# Patient Record
Sex: FEMALE | Race: WHITE | Employment: FULL TIME | URBAN - METROPOLITAN AREA
[De-identification: names, ages, dates, MRNs, and addresses within clinical notes are randomized per-mention and may not be internally consistent; named-entity substitution may affect disease eponyms.]

---

## 2017-02-23 LAB
HBV SURFACE AG SERPL QL IA: NEGATIVE
HIV 1+2 AB+HIV1 P24 AG SERPL QL IA: NEGATIVE
RUBELLA ANTIBODY IGG QUANTITATIVE: NORMAL IU/ML
T PALLIDUM IGG SER QL: NONREACTIVE

## 2017-08-31 LAB — GROUP B STREP PCR: NEGATIVE

## 2017-09-18 ENCOUNTER — HOSPITAL ENCOUNTER (INPATIENT)
Facility: CLINIC | Age: 32
LOS: 2 days | Discharge: HOME | End: 2017-09-20
Attending: OBSTETRICS & GYNECOLOGY | Admitting: OBSTETRICS & GYNECOLOGY
Payer: COMMERCIAL

## 2017-09-18 PROBLEM — Z34.90 PREGNANCY: Status: ACTIVE | Noted: 2017-09-18

## 2017-09-18 PROBLEM — Z36.89 ENCOUNTER FOR TRIAGE IN PREGNANT PATIENT: Status: ACTIVE | Noted: 2017-09-18

## 2017-09-18 PROCEDURE — 25000128 H RX IP 250 OP 636: Performed by: OBSTETRICS & GYNECOLOGY

## 2017-09-18 PROCEDURE — 0KQM0ZZ REPAIR PERINEUM MUSCLE, OPEN APPROACH: ICD-10-PCS | Performed by: OBSTETRICS & GYNECOLOGY

## 2017-09-18 PROCEDURE — 40000083 ZZH STATISTIC IP LACTATION SERVICES 1-15 MIN

## 2017-09-18 PROCEDURE — 25000132 ZZH RX MED GY IP 250 OP 250 PS 637: Performed by: OBSTETRICS & GYNECOLOGY

## 2017-09-18 PROCEDURE — 25000125 ZZHC RX 250: Performed by: OBSTETRICS & GYNECOLOGY

## 2017-09-18 PROCEDURE — 99215 OFFICE O/P EST HI 40 MIN: CPT

## 2017-09-18 PROCEDURE — 25000128 H RX IP 250 OP 636

## 2017-09-18 PROCEDURE — 72200001 ZZH LABOR CARE VAGINAL DELIVERY SINGLE

## 2017-09-18 PROCEDURE — 12000029 ZZH R&B OB INTERMEDIATE

## 2017-09-18 RX ORDER — ONDANSETRON 2 MG/ML
4 INJECTION INTRAMUSCULAR; INTRAVENOUS EVERY 6 HOURS PRN
Status: DISCONTINUED | OUTPATIENT
Start: 2017-09-18 | End: 2017-09-18

## 2017-09-18 RX ORDER — METHYLERGONOVINE MALEATE 0.2 MG/ML
INJECTION INTRAVENOUS
Status: COMPLETED
Start: 2017-09-18 | End: 2017-09-18

## 2017-09-18 RX ORDER — OXYTOCIN 10 [USP'U]/ML
10 INJECTION, SOLUTION INTRAMUSCULAR; INTRAVENOUS
Status: DISCONTINUED | OUTPATIENT
Start: 2017-09-18 | End: 2017-09-20 | Stop reason: HOSPADM

## 2017-09-18 RX ORDER — NALOXONE HYDROCHLORIDE 0.4 MG/ML
.1-.4 INJECTION, SOLUTION INTRAMUSCULAR; INTRAVENOUS; SUBCUTANEOUS
Status: DISCONTINUED | OUTPATIENT
Start: 2017-09-18 | End: 2017-09-18

## 2017-09-18 RX ORDER — OXYTOCIN/0.9 % SODIUM CHLORIDE 30/500 ML
100-340 PLASTIC BAG, INJECTION (ML) INTRAVENOUS CONTINUOUS PRN
Status: COMPLETED | OUTPATIENT
Start: 2017-09-18 | End: 2017-09-18

## 2017-09-18 RX ORDER — IBUPROFEN 800 MG/1
800 TABLET, FILM COATED ORAL
Status: DISCONTINUED | OUTPATIENT
Start: 2017-09-18 | End: 2017-09-18

## 2017-09-18 RX ORDER — BISACODYL 10 MG
10 SUPPOSITORY, RECTAL RECTAL DAILY PRN
Status: DISCONTINUED | OUTPATIENT
Start: 2017-09-20 | End: 2017-09-20 | Stop reason: HOSPADM

## 2017-09-18 RX ORDER — FENTANYL CITRATE 50 UG/ML
50-100 INJECTION, SOLUTION INTRAMUSCULAR; INTRAVENOUS
Status: DISCONTINUED | OUTPATIENT
Start: 2017-09-18 | End: 2017-09-18

## 2017-09-18 RX ORDER — OXYTOCIN/0.9 % SODIUM CHLORIDE 30/500 ML
340 PLASTIC BAG, INJECTION (ML) INTRAVENOUS CONTINUOUS PRN
Status: DISCONTINUED | OUTPATIENT
Start: 2017-09-18 | End: 2017-09-20 | Stop reason: HOSPADM

## 2017-09-18 RX ORDER — AMOXICILLIN 250 MG
1-2 CAPSULE ORAL 2 TIMES DAILY
Status: DISCONTINUED | OUTPATIENT
Start: 2017-09-18 | End: 2017-09-20 | Stop reason: HOSPADM

## 2017-09-18 RX ORDER — EPHEDRINE SULFATE 50 MG/ML
INJECTION, SOLUTION INTRAMUSCULAR; INTRAVENOUS; SUBCUTANEOUS
Status: DISCONTINUED
Start: 2017-09-18 | End: 2017-09-18 | Stop reason: HOSPADM

## 2017-09-18 RX ORDER — ACETAMINOPHEN 325 MG/1
650 TABLET ORAL EVERY 4 HOURS PRN
Status: DISCONTINUED | OUTPATIENT
Start: 2017-09-18 | End: 2017-09-20 | Stop reason: HOSPADM

## 2017-09-18 RX ORDER — OXYTOCIN/0.9 % SODIUM CHLORIDE 30/500 ML
100 PLASTIC BAG, INJECTION (ML) INTRAVENOUS CONTINUOUS
Status: DISCONTINUED | OUTPATIENT
Start: 2017-09-18 | End: 2017-09-19 | Stop reason: CLARIF

## 2017-09-18 RX ORDER — NALOXONE HYDROCHLORIDE 0.4 MG/ML
.1-.4 INJECTION, SOLUTION INTRAMUSCULAR; INTRAVENOUS; SUBCUTANEOUS
Status: DISCONTINUED | OUTPATIENT
Start: 2017-09-18 | End: 2017-09-20 | Stop reason: HOSPADM

## 2017-09-18 RX ORDER — IBUPROFEN 400 MG/1
400-800 TABLET, FILM COATED ORAL EVERY 6 HOURS PRN
Status: DISCONTINUED | OUTPATIENT
Start: 2017-09-18 | End: 2017-09-20 | Stop reason: HOSPADM

## 2017-09-18 RX ORDER — OXYTOCIN 10 [USP'U]/ML
10 INJECTION, SOLUTION INTRAMUSCULAR; INTRAVENOUS
Status: DISCONTINUED | OUTPATIENT
Start: 2017-09-18 | End: 2017-09-18

## 2017-09-18 RX ORDER — HYDROCORTISONE 2.5 %
CREAM (GRAM) TOPICAL 3 TIMES DAILY PRN
Status: DISCONTINUED | OUTPATIENT
Start: 2017-09-18 | End: 2017-09-20 | Stop reason: HOSPADM

## 2017-09-18 RX ORDER — METHYLERGONOVINE MALEATE 0.2 MG/ML
200 INJECTION INTRAVENOUS
Status: DISCONTINUED | OUTPATIENT
Start: 2017-09-18 | End: 2017-09-18

## 2017-09-18 RX ORDER — OXYCODONE HYDROCHLORIDE 5 MG/1
5 TABLET ORAL EVERY 4 HOURS PRN
Status: DISCONTINUED | OUTPATIENT
Start: 2017-09-18 | End: 2017-09-20 | Stop reason: HOSPADM

## 2017-09-18 RX ORDER — MISOPROSTOL 200 UG/1
400 TABLET ORAL
Status: DISCONTINUED | OUTPATIENT
Start: 2017-09-18 | End: 2017-09-20 | Stop reason: HOSPADM

## 2017-09-18 RX ORDER — ACETAMINOPHEN 325 MG/1
650 TABLET ORAL EVERY 4 HOURS PRN
Status: DISCONTINUED | OUTPATIENT
Start: 2017-09-18 | End: 2017-09-18

## 2017-09-18 RX ORDER — OXYCODONE AND ACETAMINOPHEN 5; 325 MG/1; MG/1
1 TABLET ORAL
Status: DISCONTINUED | OUTPATIENT
Start: 2017-09-18 | End: 2017-09-18

## 2017-09-18 RX ORDER — LANOLIN 100 %
OINTMENT (GRAM) TOPICAL
Status: DISCONTINUED | OUTPATIENT
Start: 2017-09-18 | End: 2017-09-20 | Stop reason: HOSPADM

## 2017-09-18 RX ORDER — SODIUM CHLORIDE, SODIUM LACTATE, POTASSIUM CHLORIDE, CALCIUM CHLORIDE 600; 310; 30; 20 MG/100ML; MG/100ML; MG/100ML; MG/100ML
INJECTION, SOLUTION INTRAVENOUS CONTINUOUS
Status: DISCONTINUED | OUTPATIENT
Start: 2017-09-18 | End: 2017-09-18

## 2017-09-18 RX ORDER — CARBOPROST TROMETHAMINE 250 UG/ML
250 INJECTION, SOLUTION INTRAMUSCULAR
Status: DISCONTINUED | OUTPATIENT
Start: 2017-09-18 | End: 2017-09-18

## 2017-09-18 RX ADMIN — IBUPROFEN 800 MG: 400 TABLET ORAL at 10:36

## 2017-09-18 RX ADMIN — ACETAMINOPHEN 650 MG: 325 TABLET, FILM COATED ORAL at 14:15

## 2017-09-18 RX ADMIN — ACETAMINOPHEN 650 MG: 325 TABLET, FILM COATED ORAL at 05:12

## 2017-09-18 RX ADMIN — OXYTOCIN-SODIUM CHLORIDE 0.9% IV SOLN 30 UNIT/500ML 100 ML/HR: 30-0.9/5 SOLUTION at 04:14

## 2017-09-18 RX ADMIN — IBUPROFEN 800 MG: 400 TABLET ORAL at 16:48

## 2017-09-18 RX ADMIN — OXYCODONE HYDROCHLORIDE 5 MG: 5 TABLET ORAL at 14:48

## 2017-09-18 RX ADMIN — SENNOSIDES AND DOCUSATE SODIUM 1 TABLET: 8.6; 5 TABLET ORAL at 20:14

## 2017-09-18 RX ADMIN — METHYLERGONOVINE MALEATE 200 MCG: 0.2 INJECTION INTRAVENOUS at 05:13

## 2017-09-18 RX ADMIN — IBUPROFEN 800 MG: 400 TABLET ORAL at 03:51

## 2017-09-18 RX ADMIN — ACETAMINOPHEN 650 MG: 325 TABLET, FILM COATED ORAL at 18:21

## 2017-09-18 RX ADMIN — LIDOCAINE HYDROCHLORIDE 20 ML: 10 INJECTION, SOLUTION INFILTRATION; PERINEURAL at 03:14

## 2017-09-18 RX ADMIN — IBUPROFEN 800 MG: 400 TABLET ORAL at 22:46

## 2017-09-18 RX ADMIN — ACETAMINOPHEN 650 MG: 325 TABLET, FILM COATED ORAL at 09:33

## 2017-09-18 RX ADMIN — SODIUM CHLORIDE, POTASSIUM CHLORIDE, SODIUM LACTATE AND CALCIUM CHLORIDE 1000 ML: 600; 310; 30; 20 INJECTION, SOLUTION INTRAVENOUS at 02:39

## 2017-09-18 RX ADMIN — SENNOSIDES AND DOCUSATE SODIUM 1 TABLET: 8.6; 5 TABLET ORAL at 09:33

## 2017-09-18 RX ADMIN — OXYTOCIN-SODIUM CHLORIDE 0.9% IV SOLN 30 UNIT/500ML 340 ML/HR: 30-0.9/5 SOLUTION at 03:10

## 2017-09-18 NOTE — LACTATION NOTE
CLIFFORD to see patient briefly.  She was sleepy at the time of the visit.  She reports that her baby has been nursing well and she has no questions.  She was encouraged to call  for follow up prior to discharge today if needed or if any questions arise when she is more awake.

## 2017-09-18 NOTE — PROGRESS NOTES
"[facility] Obstetrics Post-Partum Progress Note        Interval History:   Doing well.  Pain is adequately controlled.  Vaginal bleeding is normal postpartum flow after Methergine.  Breastfeeding well.           Significant Problems:      Patient Active Problem List   Diagnosis     Indication for care in labor or delivery     Encounter for triage in pregnant patient     Pregnancy             Review of Systems:    The patient denies any chest pain, shortness of breath, excessive pain, fever, chills, nausea or vomiting.          Medications:   All medications related to the patient's surgery have been reviewed          Physical Exam:     Patient Vitals for the past 24 hrs:   BP Temp Temp src Pulse Resp Height Weight   09/18/17 0800 128/72 98.6  F (37  C) - 84 18 - -   09/18/17 0529 127/74 - - - - - -   09/18/17 0514 120/67 - - - - - -   09/18/17 0459 126/66 - - - - - -   09/18/17 0444 123/70 - - - - - -   09/18/17 0429 119/72 98.8  F (37.1  C) Oral - 18 - -   09/18/17 0414 116/74 - - - - - -   09/18/17 0404 124/73 - - - - - -   09/18/17 0344 125/74 - - - - - -   09/18/17 0329 130/77 - - - - - -   09/18/17 0322 130/85 - - - 22 - -   09/18/17 0213 - - - - - 1.626 m (5' 4\") 75.8 kg (167 lb)   09/18/17 0206 130/84 - - - - - -     All vitals stable   Uterine fundus is firm, non-tender and at the level of the umbilicus  Episiotomy sutures intact and wound healing well  Lower extremities without edema or tenderness          Data:   No results found for: HGB         Assessment and Plan:    Assessment:    Post-partum day #0  Normal spontaneous vaginal delivery   Pain minimal at this time.  Doing well.      Plan:   Reportable signs and symptoms dicussed with the patient  Anticipate discharge in 1-2 days      Herminia Stacie Arvizu  September 18, 2017  10:26 AM       "

## 2017-09-18 NOTE — PLAN OF CARE
Problem: Goal Outcome Summary  Goal: Goal Outcome Summary  Outcome: Improving  Patient is stable, gaining strength, independence, and actively working on breastfeeding. FOB present and attentive at the bedside. I have provided for patient rest, as she is exhausted and sleep is her primary goal at this time.

## 2017-09-18 NOTE — H&P
"Gretel Charles  [id number]  OB Admit History & Physical      HPI:  Ms. Charles  is a 32 year old  @ 39w1d by LMP who presented to L&D for  SROM ad regular contractions.      Prenatal course:  1st visit at 10 weeks, regular care, TWG 34#.    Pregnancy complications:  H/o 2 miscarriages    Prenatal labs:  B+, antibody screen negative,  Rubella  Immune, Hep B/HIV/RPR all negative, GC/CT negative, ,  GBS neg; genetic screening tests neg     OB history:   Obstetric History       T1      L1     SAB0   TAB0   Ectopic0   Multiple0   Live Births1       # Outcome Date GA Lbr Jw/2nd Weight Sex Delivery Anes PTL Lv   4 Current            3 Term /15 41w5d 06:30 / 02:34 3.29 kg (7 lb 4.1 oz) F Vag-Spont EPI N SOCRATES      Apgar1:  7                Apgar5: 9   2 SAB            1 SAB                     PMHx:     Past Medical History:   Diagnosis Date     Abnormal Pap smear      , normal now        PSHX:    Past Surgical History:   Procedure Laterality Date     C ORAL SURGERY PROCEDURE      wisdom teeth     HYSTEROSCOPY         Meds:    No current outpatient prescriptions on file.       Allergies: Penicillins      REVIEW OF SYSTEMS:  Positives and negatives in HPI.     SocHx:    Social History     Social History     Marital status:      Spouse name: N/A     Number of children: N/A     Years of education: N/A     Occupational History     Not on file.     Social History Main Topics     Smoking status: Never Smoker     Smokeless tobacco: Never Used     Alcohol use No     Drug use: No     Sexual activity: Not on file     Other Topics Concern     Not on file     Social History Narrative        Fam Hx:  No family history on file.      PHYSICAL EXAM:      Vitals:  /84  Ht 1.626 m (5' 4\")  Wt 75.8 kg (167 lb)  BMI 28.67 kg/m2  Alert Awake in NAD  ABD gravid, non-tender, EFW 7#  Cervix:  5 cm / 90 % effaced at -2 station, membranes SROM, fluid clear  EFM:  Baseline 130, with moderate " variability, accels present, no decels  Everest: contractions q2-3 min    Assessment:  IUP at 39w1d admitted for SROM and regular contractions.     Plan:  Admission            Continuous fetal and uterine monitoring            Analgesia            The plan of care was discussed with the patient and her partner.  They expressed understanding and agreement.             Anticipate JEANIE Arvizu MD  Dept of OB/GYN  2017

## 2017-09-18 NOTE — PROVIDER NOTIFICATION
09/18/17 0215   Provider Notification   Provider Name/Title Dr. Arvizu   Method of Notification At Bedside   Notification Reason Membrane Status;Uterine Activity;Pain;Lab/Diagnostic Study;SVE   Pt arrived at 0200.  At 0215, MD at bedside.  Updated MD of ALANA at 0040, uterine activity, pain, pt uncertain if she wants pain medication, GBS negative.  Orders received to admit for delivery.

## 2017-09-18 NOTE — PROVIDER NOTIFICATION
Called to request stronger analgesic med for patient. She is having more uterine cramping this afternoon, rating pain higher, with obvious grimacing and more guarded movement. She has been using tylenol and ibuprofen without much relief. He plans to order oxycodone 5mg for her. Will dispense when available and evaluate results.

## 2017-09-18 NOTE — L&D DELIVERY NOTE
OB Vaginal Delivery Note      HPI:  Pt is a 32 year old  @ 39w1d who presented to L&D on 2017 for SROM and regular contractions.            Prenatal labs:  B+ antibody screen: negative, Rubella immune,  Hep B/HIV/RPR all negative, GC/CT negative, , GBS neg    Pregnancy complications:  H/o miscarriage x 2    Hospital Course:    First Stage: On admission, contractions were every 5 minutes and patient was 90%/5/-2 dilated. FHTs were in the 130 with accelerations present. moderate variability,  no decelerations noted.   Abdomen was non-tender.  EFW was 8# by Leopold's.  Patient's labor progressed on its own.    At the patient's request, she did not receive  analgesia.  She did not receive pitocin.  SROM occurred at 0040 with clear fluid noted.  Patient reached complete cervical dilation at 0255 on 2017.    Second Stage:  Patient did not  labor down , and began pushing at 0256.  Good maternal expulsive efforts were noted.  Fetal heart tones remained reassuring during the second stage.  Baseline 130, with moderate variability, no decelerations noted.  She was able to bring the fetal vertex to a full crown.  The fetal vertex was delivered and remained close to the perineum.  The mother's head was brought down.  McRobert's was performed and suprapubic pressure along with gentle traction.  Delivery of the posterior shoulder was attempt followed by Woods screw manuever.  The posterior shoulder than delivered followed by the remainder of the infant.  A  nuchal cord was not present.  A male infant was then delivered at 0309 on 2017.  The mouth and nares were bulb suctioned.  The cord was clamped after it had stopped pulsating, cut and the infant was placed on the mother's abdomen.  The infant's weight was 8 pounds 5 ounces.  Apgars were 8 and 9 at one and five minutes .       Third Stage:  The placenta then delivered at 0326 .  It was noted to be intact with a three vessel cord.  The patient's  perineum was inspected and there was a 2nd degree episiotomy .  The area was infiltrated with 1% lidocaine and repaired in the usual fashion using 3-0 vicryl suture.  EBL for the procedure was 66cc  Delivery Summary    Gretel Charles MRN# [id number]   Age: 32 year old YOB: 1985             Labor Event Times    Labor onset date:  17 Onset time:  12:40 AM   Dilation complete date:  17 Complete time:   2:55 AM   Start pushing date/time:  2017 0256            Labor Events     labor?:  No    steroids:  None   Labor Type:  Spontaneous   Predominate monitoring during 1st stage:  continuous electronic fetal monitoring      Antibiotics received during labor?:  No      Rupture date/time: 17 0040   Rupture type:  Spontaneous rupture of membranes occuring during spontaneous labor or augmentation   Fluid color:  Clear      Delivery/Placenta Date and Time    Delivery Date:  17 Delivery Time:   3:09 AM   Placenta Date/Time:  2017  3:26 AM   Oxytocin given at the time of delivery:  after delivery of baby      Vaginal Counts    Initial count performed by 2 team members:   Two Team Members   Charles Arvizu          Needles Suture Posey Sponges Instruments   Initial counts 2  5    Added to count  2     Final counts 2 2 5       Placed during labor Accounted for at the end of labor   No NA   No NA   No NA      Final count performed by 2 team members:   Two Team Members   Charles Dwyer         Final count correct?:  Yes         Apgars    Living status:  Living    1 Minute 5 Minute 10 Minute 15 Minute 20 Minute   Skin color: 0  1       Heart rate: 2  2       Reflex irritability: 2  2       Muscle tone: 2  2       Respiratory effort: 2  2       Total: 8  9          Apgars assigned by:  CHARLES YBARRA RN      Cord    Vessels:  3 Vessels Complications:  None   Cord Blood Disposition:  Lab Gases Sent?:  No         [place] Resuscitation   "  Methods:  None      Output in Delivery Room:  Stool      [place] Measurements    Weight:  8 lb 5 oz Length:  1' 8.5\"   Head circumference:  36.5 cm       Skin to Skin and Feeding Plan    Skin to skin initiation date/time: 17   Skin to skin with:  Mother   Skin to skin end date/time:        Labor Events and Shoulder Dystocia    Fetal Tracing Prior to Delivery:  Category 2   Shoulder dystocia present?:  Pos    Time body delivered:  0310   Time head delivered:  0309    Time recognized:  2017 0309    Gentle attempt at traction, assisted by maternal expulsive forces?:  Yes             First maneuver:  Sina maneuver, Suprapubic pressure, Santillan screw maneuver   Time performed:  2017 0309          Delivery (Maternal) (Provider to Complete) (745029)    Episiotomy:  Median   Perineal lacerations:  2nd Repaired?:  Yes   Periurethral laceration:  right Repaired?:  No   Vaginal laceration?:  No    Cervical laceration?:  No          Mother's Information  Mother: Gretel Charles #[id number]    Start of Mother's Information     IO Blood Loss  17 0040 - 17 0401    Mom's I/O Activity            End of Mother's Information  Mother: Gretel Charles #[id number]            Delivery - Provider to Complete (312933)    Attempted Delivery Types (Choose all that apply):  Spontaneous Vaginal Delivery   Delivery Type (Choose the 1 that will go to the Birth History):  Vaginal, Spontaneous Delivery                           Placenta    Delayed Cord Clamping:  Done   Date/Time:  2017  3:26 AM   Removal:  Spontaneous   Disposition:  Hospital disposal      Anesthesia    Method:  Local         Presentation and Position    Presentation:  Vertex   Position:  Left Occiput Anterior                    Herminia Arvizu MD.    Sponge and needle counts were correct.  The patient and infant remained in the delivery suite following delivery in stable condition.    Herminia Arvizu MD  2017  3:48 AM          "

## 2017-09-18 NOTE — LACTATION NOTE
LC to see patient.  She is a new delivery and states that her baby has nursed well several times already.  She used a nipple shield with her first baby and then pumped and bottled EBM for over a year.  Plan for this baby is to continue to work on latch without use of a shield.  Normal course of lactation within the first 24 hours was reviewed as well as how to awaken her baby and STS feeds.  Plan for follow up tomorrow or prn.

## 2017-09-18 NOTE — IP AVS SNAPSHOT
[facility]    [address]    [facility] [address]    Phone:  [phone]    Fax:  [phone]                                       After Visit Summary   9/18/2017    Gretel Charles    MRN: [id number]           After Visit Summary Signature Page     I have received my discharge instructions, and my questions have been answered. I have discussed any challenges I see with this plan with the nurse or doctor.    ..........................................................................................................................................  Patient/Patient Representative Signature      ..........................................................................................................................................  Patient Representative Print Name and Relationship to Patient    ..................................................               ................................................  Date                                            Time    ..........................................................................................................................................  Reviewed by Signature/Title    ...................................................              ..............................................  Date                                                            Time

## 2017-09-18 NOTE — PLAN OF CARE
Problem: Postpartum, Vaginal Delivery (Adult)  Goal: Signs and Symptoms of Listed Potential Problems Will be Absent or Manageable (Postpartum, Vaginal Delivery)  Signs and symptoms of listed potential problems will be absent or manageable by discharge/transition of care (reference Postpartum, Vaginal Delivery (Adult) CPG).   Outcome: Improving  Data: Gretel Charles transferred to 425 via wheelchair at 0545. Baby transferred via parent's arms.  Action: Receiving unit notified of transfer: Yes. Patient and family notified of room change. Report given to Charity GONZÁLES at 0520. Belongings sent to receiving unit. Accompanied by Registered Nurse. Oriented patient to surroundings. Call light within reach. ID bands double-checked with receiving RN.  Response: Patient tolerated transfer and is stable.

## 2017-09-18 NOTE — PROVIDER NOTIFICATION
09/18/17 0508   Provider Notification   Provider Name/Title Dr. Arvizu   Method of Notification Phone   Request Evaluate-Remote   Notification Reason Medication Request;Status Update   Updated MD of moderate flow with small clots, fundus needed massage to firm x 1.  Orders received to give methergine.

## 2017-09-18 NOTE — PROVIDER NOTIFICATION
09/18/17 0250 09/18/17 0253   Provider Notification   Provider Name/Title Dr. Nolberto Arvizu   Method of Notification In Department At Bedside   Request Attend Delivery --    At 0250, cervix 9cm and pt feeling strong urge to push.  Requested MD to attend delivery.  MD at bedside at 0253.  10cm at 0255, started pushing at 0256.  Delivery of head at 0308, and MD recognized shoulder dystocia.  Sina performed; suprapubic pressure applied by Kasey Kay RN.  Delivery of body at 0309.  Male infant, Apgars 8 and 9.

## 2017-09-18 NOTE — IP AVS SNAPSHOT
MRN:[id number]                      After Visit Summary   9/18/2017    Gretel Charles    MRN: [id number]           Thank you!     Thank you for choosing [facility] for your care. Our goal is always to provide you with excellent care. Hearing back from our patients is one way we can continue to improve our services. Please take a few minutes to complete the written survey that you may receive in the mail after you visit. If you would like to speak to someone directly about your visit please contact Patient Relations at [phone]. Thank you!          Patient Information     Date Of Birth          1985        Designated Caregiver       Most Recent Value    Caregiver    Will someone help with your care after discharge? no      About your hospital stay     You were admitted on:  September 18, 2017 You last received care in the:  [facility] Postpartum    You were discharged on:  September 20, 2017       Who to Call     For medical emergencies, please call 911.  For non-urgent questions about your medical care, please call your primary care provider or clinic, [phone]          Attending Provider     Provider Specialty    Herminia Arvizu MD OB/Gyn       Primary Care Provider Office Phone # Fax #    Elyssa Blanca [phone]      After Care Instructions     Activity       Review discharge instructions            Diet       Resume previous diet            Discharge Instructions - Gestational diabetic patients       Gestational diabetic patients to follow up for fasting blood sugar and 2 hour 75gm glucose load at 6 weeks postpartum.            Discharge Instructions - Postpartum visit       Schedule postpartum visit with your provider and return to clinic in 6 weeks.                  Further instructions from your care team       Postpartum Vaginal Delivery Instructions  [facility] lactation: [phone]  Activity       Ask family and friends for help  when you need it.    Do not place anything in your vagina for 6 weeks.    You are not restricted on other activities, but take it easy for a few weeks to allow your body to recover from delivery.  You are able to do any activities you feel up to that point.    No driving until you have stopped taking your pain medications (usually two weeks after delivery).     Call your health care provider if you have any of these symptoms:       Increased pain, swelling, redness, or fluid around your stiches from an episiotomy or perineal tear.    A fever above 100.4 F (38 C) with or without chills when placing a thermometer under your tongue.    You soak a sanitary pad with blood within 1 hour, or you see blood clots larger than a golf ball.    Bleeding that lasts more than 6 weeks.    Vaginal discharge that smells bad.    Severe pain, cramping or tenderness in your lower belly area.    A need to urinate more frequently (use the toilet more often), more urgently (use the toilet very quickly), or it burns when you urinate.    Nausea and vomiting.    Redness, swelling or pain around a vein in your leg.    Problems breastfeeding or a red or painful area on your breast.    Chest pain and cough or are gasping for air.    Problems coping with sadness, anxiety, or depression.  If you have any concerns about hurting yourself or the baby, call your provider immediately.     You have questions or concerns after you return home.     Keep your hands clean:  Always wash your hands before touching your perineal area and stitches.  This helps reduce your risk of infection.  If your hands aren't dirty, you may use an alcohol hand-rub to clean your hands. Keep your nails clean and short.        Pending Results     No orders found from 9/16/2017 to 9/19/2017.            Statement of Approval     Ordered          09/20/17 0848  I have reviewed and agree with all the recommendations and orders detailed in this document.  EFFECTIVE NOW     Approved  "and electronically signed by:  Kenji Bui MD             Admission Information     Date & Time Provider Department Dept. Phone    2017 Herminia Arvizu MD [facility] [phone]      Your Vitals Were     Blood Pressure Pulse Temperature Respirations Height Weight    130/77 88 98.6  F (37  C) (Oral) 18 1.626 m (5' 4\") 75.8 kg (167 lb)    BMI (Body Mass Index)                   28.67 kg/m2           MyChart Information     [employer] lets you send messages to your doctor, view your test results, renew your prescriptions, schedule appointments and more. To sign up, go to [email or web address] . Click on \"Log in\" on the left side of the screen, which will take you to the Welcome page. Then click on \"Sign up Now\" on the right side of the page.     You will be asked to enter the access code listed below, as well as some personal information. Please follow the directions to create your username and password.     Your access code is: [email or web address]  Expires: 2017 11:20 AM     Your access code will  in 90 days. If you need help or a new code, please call your [place] clinic or [phone].        Care EveryWhere ID     This is your Care EveryWhere ID. This could be used by other organizations to access your [place] medical records  [id number]        Equal Access to Services     [facility]: Hadii ebenezer sinha hadasho Sovipin, waaxda luqadaha, qaybta kaalmada adeegyada, edmond wan . So [facility] [phone].    ATENCIÓN: Si habla español, tiene a george disposición servicios gratuitos de asistencia lingüística. Llame al [phone].    We comply with applicable federal civil rights laws and [place] laws. We do not discriminate on the basis of race, color, national origin, age, disability sex, sexual orientation or gender identity.               Review of your medicines      CONTINUE these medicines which have NOT CHANGED        Dose / Directions    prenatal " multivitamin plus iron 27-0.8 MG Tabs per tablet        Dose:  1 tablet   Take 1 tablet by mouth daily   Refills:  0       VITAMIN D (CHOLECALCIFEROL) PO        Dose:  2000 Units   Take 2,000 Units by mouth daily   Refills:  0                Protect others around you: Learn how to safely use, store and throw away your medicines at www.disposemymeds.org.             Medication List: This is a list of all your medications and when to take them. Check marks below indicate your daily home schedule. Keep this list as a reference.      Medications           Morning Afternoon Evening Bedtime As Needed    prenatal multivitamin plus iron 27-0.8 MG Tabs per tablet   Take 1 tablet by mouth daily                                VITAMIN D (CHOLECALCIFEROL) PO   Take 2,000 Units by mouth daily

## 2017-09-18 NOTE — PLAN OF CARE
Problem: Goal Outcome Summary  Goal: Goal Outcome Summary  Outcome: Improving  VSS, Bonding well with baby. Pain is well controlled with ibuprofen, tylenol, ice, and tucks. Patient is voiding without difficulty using the analia-bottle and ambulating independently. Tolerating regular diet well. Independent in self and baby cares. Breastfeeding is going well. Room orientation and  packet given, bulb syringe explained, other education also given see care plan. Meeting expected outcomes. Will continue to monitor.

## 2017-09-19 LAB
HGB BLD-MCNC: 11 G/DL (ref 11.7–15.7)
T PALLIDUM IGG+IGM SER QL: NEGATIVE

## 2017-09-19 PROCEDURE — 85018 HEMOGLOBIN: CPT | Performed by: OBSTETRICS & GYNECOLOGY

## 2017-09-19 PROCEDURE — 40000083 ZZH STATISTIC IP LACTATION SERVICES 1-15 MIN

## 2017-09-19 PROCEDURE — 36415 COLL VENOUS BLD VENIPUNCTURE: CPT | Performed by: OBSTETRICS & GYNECOLOGY

## 2017-09-19 PROCEDURE — 25000132 ZZH RX MED GY IP 250 OP 250 PS 637: Performed by: OBSTETRICS & GYNECOLOGY

## 2017-09-19 PROCEDURE — 12000027 ZZH R&B OB

## 2017-09-19 PROCEDURE — 86780 TREPONEMA PALLIDUM: CPT | Performed by: OBSTETRICS & GYNECOLOGY

## 2017-09-19 RX ADMIN — ACETAMINOPHEN 650 MG: 325 TABLET, FILM COATED ORAL at 00:12

## 2017-09-19 RX ADMIN — IBUPROFEN 800 MG: 400 TABLET ORAL at 20:25

## 2017-09-19 RX ADMIN — IBUPROFEN 800 MG: 400 TABLET ORAL at 13:38

## 2017-09-19 RX ADMIN — ACETAMINOPHEN 650 MG: 325 TABLET, FILM COATED ORAL at 10:16

## 2017-09-19 RX ADMIN — IBUPROFEN 800 MG: 400 TABLET ORAL at 05:35

## 2017-09-19 NOTE — PLAN OF CARE
Problem: Goal Outcome Summary  Goal: Goal Outcome Summary  Outcome: Improving  Pt able to get some rest between cares during the night.  Ice and tucks to lac/epis providing comfort.  Pain medications and heating pad decreasing discomfort from cramping.  Independent w/ self cares; FOB helpful w/ baby cares and supportive.  Plan to continue to provide comfort measures and allow to rest.

## 2017-09-19 NOTE — LACTATION NOTE
CLIFFORD to see patient.  She is nursing well without difficulty.  No shield needed.  He was in the NBN for a circumcision at the time of the visit.  CLIFFORD reviewed nursing expectations following a procedure as well as expectations for cluster feeds over the evening and night tonight.  He has HIR jaundice.  CLIFFORD also reviewed the importance of attempting to nurse him often and awakening him to feed.  She has no questions and will call prn.

## 2017-09-19 NOTE — PLAN OF CARE
Problem: Goal Outcome Summary  Goal: Goal Outcome Summary  Outcome: No Change  VSS, cramping pain is controlled with pharmacological interventions, ambulating well in room, breast feeding; pt filled out PPD, encouraged pt to do a self check test at home in 1-2 weeks, turned in birth certificate; plan of care is updated with pt and spouse.

## 2017-09-19 NOTE — PROGRESS NOTES
"OB Post-partum Note  PPD# 1    S:  Patient doing well.  Pain controlled.  Voiding.  Bleeding is normal.  Breast feeding.    O:  /78  Pulse 73  Temp 98.8  F (37.1  C) (Oral)  Resp 18  Ht 1.626 m (5' 4\")  Wt 75.8 kg (167 lb)  Breastfeeding? yes  BMI 28.67 kg/m2  Gen- A&O, NAD  Abd- Non-tender, fundus firm at umbilicus  Ext- non-tender, no edema, no leg or calf pain    Hemoglobin   Date Value Ref Range Status   2017 11.0 (L) 11.7 - 15.7 g/dL Final     B+  Rubella Immune    A/P: 32 year old  PPD# 1 s/p     1.  Routine post-partum cares  2.  Analgesia - oral pain meds  3.  Discharge likely tomorrow.  If desires D/C today will let RN know.  4.  The plan of care was discussed with the patient.  She expressed understanding and agreement        Deandra Briceño  2017  8:17 AM  "

## 2017-09-19 NOTE — PLAN OF CARE
Problem: Goal Outcome Summary  Goal: Goal Outcome Summary  Outcome: Improving  Data: Vital signs within normal limits. Postpartum checks within normal limits - see flow record. Patient eating and drinking normally. Patient able to empty bladder independently and is up ambulating. No apparent signs of infection. Episiotomy healing well. Patient performing self cares and is able to care for infant.  Action: Patient medicated during the shift for cramping. See MAR. Patient reassessed within 1 hour after each medication and pain was improved - patient stated she was comfortable. Patient education done about postpartum cares. See flow record.  Response: Positive attachment behaviors observed with infant. Support persons Marquise present.   Plan: Anticipate possible early discharge on 9/19/17, patient still considering.

## 2017-09-20 VITALS
SYSTOLIC BLOOD PRESSURE: 130 MMHG | HEART RATE: 88 BPM | DIASTOLIC BLOOD PRESSURE: 77 MMHG | HEIGHT: 64 IN | WEIGHT: 167 LBS | RESPIRATION RATE: 18 BRPM | TEMPERATURE: 98.6 F | BODY MASS INDEX: 28.51 KG/M2

## 2017-09-20 PROCEDURE — 40000083 ZZH STATISTIC IP LACTATION SERVICES 1-15 MIN

## 2017-09-20 PROCEDURE — 25000132 ZZH RX MED GY IP 250 OP 250 PS 637: Performed by: OBSTETRICS & GYNECOLOGY

## 2017-09-20 RX ADMIN — IBUPROFEN 800 MG: 400 TABLET ORAL at 02:16

## 2017-09-20 RX ADMIN — IBUPROFEN 800 MG: 400 TABLET ORAL at 08:31

## 2017-09-20 NOTE — PLAN OF CARE
Problem: Goal Outcome Summary  Goal: Goal Outcome Summary  Outcome: Adequate for Discharge Date Met:  09/20/17  Discharge education is completed, pt is aware about follow up with ob dr, no further questions, will be discharging shortly with baby to home.

## 2017-09-20 NOTE — PLAN OF CARE
Problem: Goal Outcome Summary  Goal: Goal Outcome Summary  Outcome: Improving  Pt. VSS. Independent in infant and personal cares. Pain managed well with ibuprofen. Breastfeeding infant. Bonding well with infant. Anticipate discharge today, 9/20/17.

## 2017-09-20 NOTE — PLAN OF CARE
Problem: Goal Outcome Summary  Goal: Goal Outcome Summary  Outcome: Improving  Data: Vital signs within normal limits. Postpartum checks within normal limits - see flow record. Patient eating and drinking normally. Patient able to empty bladder independently and is up ambulating. No apparent signs of infection. Episiotomy and laceration healing well. Patient performing self cares and is able to care for infant.  Action: Patient medicated during the shift for cramping. See MAR. Patient reassessed within 1 hour after each medication and pain was improved - patient stated she was comfortable. Patient education done about postpartum cares. See flow record.  Response: Positive attachment behaviors observed with infant. Support persons Marquise present.   Plan: Anticipate discharge on 9/20/17.

## 2017-09-20 NOTE — PROGRESS NOTES
"OB Post-partum Note  PPD# 2    S:  Patient doing well.  Pain controlled.  Voiding.  Bleeding is normal.  Breast feeding.    O:  /77  Pulse 88  Temp 98.6  F (37  C) (Oral)  Resp 18  Ht 1.626 m (5' 4\")  Wt 75.8 kg (167 lb)  Breastfeeding? Unknown  BMI 28.67 kg/m2  Gen- A&O, NAD  Abd- Non-tender, fundus firm at umbilicus  Perineum intact, healing well  Ext- non-tender, abimbola edema, no leg or calf pain    Hemoglobin   Date Value Ref Range Status   2017 11.0 (L) 11.7 - 15.7 g/dL Final     Blood type B positive  Rubella Immune    A/P: 32 year old  PPD# 2 s/p .  Doing well post partum, no apparent complications.     1.  Routine post-partum cares  2.  Analgesia  3.  Discharge today  4.  The plan of care was discussed with the patient.  She expressed understanding and agreement  5.  RTC in 6 weeks  6.  Indications to call or return were discussed. Post partum instructions were given  7.  Patient to use OTC Ibuprofen and Tylenol.       Kenji Bui  2017  8:45 AM  "

## 2017-09-20 NOTE — PLAN OF CARE
Problem: Goal Outcome Summary  Goal: Goal Outcome Summary  Outcome: Adequate for Discharge Date Met:  09/20/17  Pt's vss, states her pain at comfort, declines flu vaccination for now and planning to get one during her ob visit; talked about discharge expectations with pt and spouse, finishing education with pt.

## 2019-10-29 ENCOUNTER — HOSPITAL ENCOUNTER (OUTPATIENT)
Dept: LAB | Facility: CLINIC | Age: 34
Discharge: HOME | End: 2019-10-29
Attending: PEDIATRICS | Admitting: PEDIATRICS
Payer: COMMERCIAL

## 2019-10-29 DIAGNOSIS — Z83.79 FAMILY HISTORY OF CELIAC DISEASE: Primary | ICD-10-CM

## 2019-10-29 LAB
BASOPHILS # BLD AUTO: 0 10E9/L (ref 0–0.2)
BASOPHILS NFR BLD AUTO: 0.6 %
DIFFERENTIAL METHOD BLD: NORMAL
EOSINOPHIL # BLD AUTO: 0.1 10E9/L (ref 0–0.7)
EOSINOPHIL NFR BLD AUTO: 1.7 %
ERYTHROCYTE [DISTWIDTH] IN BLOOD BY AUTOMATED COUNT: 12 % (ref 10–15)
HCT VFR BLD AUTO: 38.2 % (ref 35–47)
HGB BLD-MCNC: 13.2 G/DL (ref 11.7–15.7)
IMM GRANULOCYTES # BLD: 0 10E9/L (ref 0–0.4)
IMM GRANULOCYTES NFR BLD: 0.4 %
LYMPHOCYTES # BLD AUTO: 1.1 10E9/L (ref 0.8–5.3)
LYMPHOCYTES NFR BLD AUTO: 14.9 %
MCH RBC QN AUTO: 32.9 PG (ref 26.5–33)
MCHC RBC AUTO-ENTMCNC: 34.6 G/DL (ref 31.5–36.5)
MCV RBC AUTO: 95 FL (ref 78–100)
MONOCYTES # BLD AUTO: 0.5 10E9/L (ref 0–1.3)
MONOCYTES NFR BLD AUTO: 7.2 %
NEUTROPHILS # BLD AUTO: 5.5 10E9/L (ref 1.6–8.3)
NEUTROPHILS NFR BLD AUTO: 75.2 %
NRBC # BLD AUTO: 0 10*3/UL
NRBC BLD AUTO-RTO: 0 /100
PLATELET # BLD AUTO: 214 10E9/L (ref 150–450)
RBC # BLD AUTO: 4.01 10E12/L (ref 3.8–5.2)
WBC # BLD AUTO: 7.3 10E9/L (ref 4–11)

## 2019-10-29 PROCEDURE — 85025 COMPLETE CBC W/AUTO DIFF WBC: CPT | Performed by: PEDIATRICS

## 2019-10-29 PROCEDURE — 36415 COLL VENOUS BLD VENIPUNCTURE: CPT | Performed by: PEDIATRICS

## 2019-10-29 PROCEDURE — 82784 ASSAY IGA/IGD/IGG/IGM EACH: CPT | Performed by: PEDIATRICS

## 2019-10-29 PROCEDURE — 83516 IMMUNOASSAY NONANTIBODY: CPT | Mod: 91 | Performed by: PEDIATRICS

## 2019-10-29 PROCEDURE — 83516 IMMUNOASSAY NONANTIBODY: CPT | Performed by: PEDIATRICS

## 2019-10-30 LAB
IGA SERPL-MCNC: 171 MG/DL (ref 70–380)
TTG IGA SER-ACNC: <1 U/ML
TTG IGG SER-ACNC: <1 U/ML

## 2020-01-17 ENCOUNTER — OFFICE VISIT (OUTPATIENT)
Dept: URGENT CARE | Facility: URGENT CARE | Age: 35
End: 2020-01-17
Payer: COMMERCIAL

## 2020-01-17 VITALS
OXYGEN SATURATION: 97 % | DIASTOLIC BLOOD PRESSURE: 58 MMHG | HEART RATE: 93 BPM | WEIGHT: 143 LBS | SYSTOLIC BLOOD PRESSURE: 128 MMHG | TEMPERATURE: 99.5 F | BODY MASS INDEX: 24.55 KG/M2

## 2020-01-17 DIAGNOSIS — J02.9 ACUTE SORE THROAT: ICD-10-CM

## 2020-01-17 DIAGNOSIS — R68.89 FLU-LIKE SYMPTOMS: Primary | ICD-10-CM

## 2020-01-17 LAB
DEPRECATED S PYO AG THROAT QL EIA: NORMAL
FLUAV+FLUBV AG SPEC QL: NEGATIVE
FLUAV+FLUBV AG SPEC QL: NEGATIVE
SPECIMEN SOURCE: NORMAL
SPECIMEN SOURCE: NORMAL

## 2020-01-17 PROCEDURE — 87081 CULTURE SCREEN ONLY: CPT | Performed by: FAMILY MEDICINE

## 2020-01-17 PROCEDURE — 99203 OFFICE O/P NEW LOW 30 MIN: CPT | Performed by: FAMILY MEDICINE

## 2020-01-17 PROCEDURE — 87880 STREP A ASSAY W/OPTIC: CPT | Performed by: FAMILY MEDICINE

## 2020-01-17 PROCEDURE — 87804 INFLUENZA ASSAY W/OPTIC: CPT | Performed by: FAMILY MEDICINE

## 2020-01-17 NOTE — PROGRESS NOTES
SUBJECTIVE:  Gretel Charles, a 34 year old female scheduled an appointment to discuss the following issues:     Flu-like symptoms  Acute sore throat    Medical, social, surgical, and family histories reviewed.    Urgent Care    URI (x 5 days)     34-year-old female who is 21 weeks pregnant, been exposed to  and 5-year-old daughter with URI and pneumonia X respectively.  Patient symptoms started 4 days ago with chills and myalgia, headaches, sinus congestion and mild shortness of breath with cough.  She even tried her daughter's albuterol which did not help.      ROS:  See HPI.  No nausea/vomiting.  Low-grade fever/chills.  No chest pain.  No BM/urine problems.  No dizziness or syncope.      OBJECTIVE:  /58 (BP Location: Right arm, Patient Position: Sitting, Cuff Size: Adult Regular)   Pulse 93   Temp 99.5  F (37.5  C) (Oral)   Wt 64.9 kg (143 lb)   SpO2 97%   BMI 24.55 kg/m    EXAM:  GENERAL APPEARANCE:  alert and mild distress; no cyanosis or accessory muscle use, moist mucous membrane no meningeal signs,   EYES: Eyes grossly normal to inspection, PERRL and conjunctivae and sclerae normal  HENT: ear canals and TM's normal and nose and mouth without ulcers or lesions  NECK: no adenopathy, no asymmetry, masses, or scars and neck normal to palpation  RESP: lungs clear to auscultation - no rales, rhonchi or wheezes  CV: regular rates and rhythm, normal S1 S2, no S3 or S4 and no murmur, click or rub  LYMPHATICS: no cervical adenopathy  ABDOMEN: soft, nontender, without hepatosplenomegaly or masses and bowel sounds normal; gravid uterus  MS: extremities normal- no gross deformities noted  SKIN: no suspicious lesions or rashes  NEURO: Normal strength and tone, mentation intact and speech normal    ASSESSMENT/PLAN:  (R68.89) Flu-like symptoms  (primary encounter diagnosis)  Comment: Influenza A & B negative  Plan: Influenza A/B antigen        (J02.9) Acute sore throat  Comment: strep negative  Plan: Strep,  Rapid Screen, Beta strep group A culture          Most likely viral URI, will treat conservatively with acetaminophen as needed fever/pain.  Fluids, rest.  Pt to f/up PCP if no improvement or worsening.  Warning signs and symptoms explained.

## 2020-01-17 NOTE — PATIENT INSTRUCTIONS

## 2020-01-18 LAB
BACTERIA SPEC CULT: NORMAL
SPECIMEN SOURCE: NORMAL

## 2020-05-27 DIAGNOSIS — O48.0 POST TERM PREGNANCY, ANTEPARTUM CONDITION OR COMPLICATION: Primary | ICD-10-CM

## 2020-05-28 ENCOUNTER — ANESTHESIA (OUTPATIENT)
Dept: OBGYN | Facility: CLINIC | Age: 35
End: 2020-05-28
Payer: COMMERCIAL

## 2020-05-28 ENCOUNTER — ANESTHESIA EVENT (OUTPATIENT)
Dept: OBGYN | Facility: CLINIC | Age: 35
End: 2020-05-28
Payer: COMMERCIAL

## 2020-05-28 ENCOUNTER — HOSPITAL ENCOUNTER (INPATIENT)
Facility: CLINIC | Age: 35
LOS: 1 days | Discharge: HOME | End: 2020-05-29
Attending: OBSTETRICS & GYNECOLOGY | Admitting: OBSTETRICS & GYNECOLOGY
Payer: COMMERCIAL

## 2020-05-28 LAB
ABO + RH BLD: NORMAL
ABO + RH BLD: NORMAL
HGB BLD-MCNC: 10.5 G/DL (ref 11.7–15.7)
SARS-COV-2 PCR COMMENT: NORMAL
SARS-COV-2 RNA SPEC QL NAA+PROBE: NEGATIVE
SARS-COV-2 RNA SPEC QL NAA+PROBE: NORMAL
SPECIMEN EXP DATE BLD: NORMAL
SPECIMEN SOURCE: NORMAL
SPECIMEN SOURCE: NORMAL
T PALLIDUM AB SER QL: NONREACTIVE

## 2020-05-28 PROCEDURE — 25000128 H RX IP 250 OP 636: Performed by: ANESTHESIOLOGY

## 2020-05-28 PROCEDURE — 25800030 ZZH RX IP 258 OP 636: Performed by: OBSTETRICS & GYNECOLOGY

## 2020-05-28 PROCEDURE — 86901 BLOOD TYPING SEROLOGIC RH(D): CPT | Performed by: OBSTETRICS & GYNECOLOGY

## 2020-05-28 PROCEDURE — 25000125 ZZHC RX 250: Performed by: OBSTETRICS & GYNECOLOGY

## 2020-05-28 PROCEDURE — 40000671 ZZH STATISTIC ANESTHESIA CASE

## 2020-05-28 PROCEDURE — 86780 TREPONEMA PALLIDUM: CPT | Performed by: OBSTETRICS & GYNECOLOGY

## 2020-05-28 PROCEDURE — 10907ZC DRAINAGE OF AMNIOTIC FLUID, THERAPEUTIC FROM PRODUCTS OF CONCEPTION, VIA NATURAL OR ARTIFICIAL OPENING: ICD-10-PCS | Performed by: OBSTETRICS & GYNECOLOGY

## 2020-05-28 PROCEDURE — G0463 HOSPITAL OUTPT CLINIC VISIT: HCPCS

## 2020-05-28 PROCEDURE — 85018 HEMOGLOBIN: CPT | Performed by: OBSTETRICS & GYNECOLOGY

## 2020-05-28 PROCEDURE — 00HU33Z INSERTION OF INFUSION DEVICE INTO SPINAL CANAL, PERCUTANEOUS APPROACH: ICD-10-PCS | Performed by: ANESTHESIOLOGY

## 2020-05-28 PROCEDURE — 86900 BLOOD TYPING SEROLOGIC ABO: CPT | Performed by: OBSTETRICS & GYNECOLOGY

## 2020-05-28 PROCEDURE — 0KQM0ZZ REPAIR PERINEUM MUSCLE, OPEN APPROACH: ICD-10-PCS | Performed by: OBSTETRICS & GYNECOLOGY

## 2020-05-28 PROCEDURE — 0U9M0ZZ DRAINAGE OF VULVA, OPEN APPROACH: ICD-10-PCS | Performed by: OBSTETRICS & GYNECOLOGY

## 2020-05-28 PROCEDURE — 37000011 ZZH ANESTHESIA WARD SERVICE

## 2020-05-28 PROCEDURE — 72200001 ZZH LABOR CARE VAGINAL DELIVERY SINGLE

## 2020-05-28 PROCEDURE — 12000000 ZZH R&B MED SURG/OB

## 2020-05-28 PROCEDURE — 3E0R3BZ INTRODUCTION OF ANESTHETIC AGENT INTO SPINAL CANAL, PERCUTANEOUS APPROACH: ICD-10-PCS | Performed by: ANESTHESIOLOGY

## 2020-05-28 PROCEDURE — 25000128 H RX IP 250 OP 636

## 2020-05-28 PROCEDURE — 25000132 ZZH RX MED GY IP 250 OP 250 PS 637: Performed by: OBSTETRICS & GYNECOLOGY

## 2020-05-28 PROCEDURE — 36415 COLL VENOUS BLD VENIPUNCTURE: CPT | Performed by: OBSTETRICS & GYNECOLOGY

## 2020-05-28 PROCEDURE — U0003 INFECTIOUS AGENT DETECTION BY NUCLEIC ACID (DNA OR RNA); SEVERE ACUTE RESPIRATORY SYNDROME CORONAVIRUS 2 (SARS-COV-2) (CORONAVIRUS DISEASE [COVID-19]), AMPLIFIED PROBE TECHNIQUE, MAKING USE OF HIGH THROUGHPUT TECHNOLOGIES AS DESCRIBED BY CMS-2020-01-R: HCPCS | Performed by: OBSTETRICS & GYNECOLOGY

## 2020-05-28 RX ORDER — CARBOPROST TROMETHAMINE 250 UG/ML
250 INJECTION, SOLUTION INTRAMUSCULAR
Status: DISCONTINUED | OUTPATIENT
Start: 2020-05-28 | End: 2020-05-29 | Stop reason: CLARIF

## 2020-05-28 RX ORDER — OXYTOCIN 10 [USP'U]/ML
10 INJECTION, SOLUTION INTRAMUSCULAR; INTRAVENOUS
Status: DISCONTINUED | OUTPATIENT
Start: 2020-05-28 | End: 2020-05-29 | Stop reason: HOSPADM

## 2020-05-28 RX ORDER — NALBUPHINE HYDROCHLORIDE 20 MG/ML
2.5-5 INJECTION, SOLUTION INTRAMUSCULAR; INTRAVENOUS; SUBCUTANEOUS EVERY 6 HOURS PRN
Status: DISCONTINUED | OUTPATIENT
Start: 2020-05-28 | End: 2020-05-29 | Stop reason: HOSPADM

## 2020-05-28 RX ORDER — ACETAMINOPHEN 325 MG/1
650 TABLET ORAL EVERY 4 HOURS PRN
Status: DISCONTINUED | OUTPATIENT
Start: 2020-05-28 | End: 2020-05-28

## 2020-05-28 RX ORDER — METHYLERGONOVINE MALEATE 0.2 MG/ML
200 INJECTION INTRAVENOUS
Status: DISCONTINUED | OUTPATIENT
Start: 2020-05-28 | End: 2020-05-29 | Stop reason: CLARIF

## 2020-05-28 RX ORDER — NALOXONE HYDROCHLORIDE 0.4 MG/ML
.1-.4 INJECTION, SOLUTION INTRAMUSCULAR; INTRAVENOUS; SUBCUTANEOUS
Status: DISCONTINUED | OUTPATIENT
Start: 2020-05-28 | End: 2020-05-28

## 2020-05-28 RX ORDER — IBUPROFEN 800 MG/1
800 TABLET, FILM COATED ORAL
Status: DISCONTINUED | OUTPATIENT
Start: 2020-05-28 | End: 2020-05-28

## 2020-05-28 RX ORDER — MISOPROSTOL 200 UG/1
800 TABLET ORAL
Status: DISCONTINUED | OUTPATIENT
Start: 2020-05-28 | End: 2020-05-29 | Stop reason: HOSPADM

## 2020-05-28 RX ORDER — OXYTOCIN/0.9 % SODIUM CHLORIDE 30/500 ML
100-340 PLASTIC BAG, INJECTION (ML) INTRAVENOUS CONTINUOUS PRN
Status: COMPLETED | OUTPATIENT
Start: 2020-05-28 | End: 2020-05-28

## 2020-05-28 RX ORDER — OXYTOCIN 10 [USP'U]/ML
10 INJECTION, SOLUTION INTRAMUSCULAR; INTRAVENOUS
Status: DISCONTINUED | OUTPATIENT
Start: 2020-05-28 | End: 2020-05-28

## 2020-05-28 RX ORDER — OXYTOCIN/0.9 % SODIUM CHLORIDE 30/500 ML
340 PLASTIC BAG, INJECTION (ML) INTRAVENOUS CONTINUOUS PRN
Status: DISCONTINUED | OUTPATIENT
Start: 2020-05-28 | End: 2020-05-29 | Stop reason: HOSPADM

## 2020-05-28 RX ORDER — IBUPROFEN 800 MG/1
800 TABLET, FILM COATED ORAL EVERY 6 HOURS PRN
Status: DISCONTINUED | OUTPATIENT
Start: 2020-05-28 | End: 2020-05-29 | Stop reason: HOSPADM

## 2020-05-28 RX ORDER — SODIUM CHLORIDE, SODIUM LACTATE, POTASSIUM CHLORIDE, CALCIUM CHLORIDE 600; 310; 30; 20 MG/100ML; MG/100ML; MG/100ML; MG/100ML
INJECTION, SOLUTION INTRAVENOUS CONTINUOUS
Status: DISCONTINUED | OUTPATIENT
Start: 2020-05-28 | End: 2020-05-29 | Stop reason: CLARIF

## 2020-05-28 RX ORDER — LIDOCAINE HYDROCHLORIDE AND EPINEPHRINE 15; 5 MG/ML; UG/ML
3 INJECTION, SOLUTION EPIDURAL
Status: DISCONTINUED | OUTPATIENT
Start: 2020-05-28 | End: 2020-05-29 | Stop reason: CLARIF

## 2020-05-28 RX ORDER — ACETAMINOPHEN 325 MG/1
650 TABLET ORAL EVERY 4 HOURS PRN
Status: DISCONTINUED | OUTPATIENT
Start: 2020-05-28 | End: 2020-05-29 | Stop reason: HOSPADM

## 2020-05-28 RX ORDER — OXYCODONE AND ACETAMINOPHEN 5; 325 MG/1; MG/1
1 TABLET ORAL
Status: DISCONTINUED | OUTPATIENT
Start: 2020-05-28 | End: 2020-05-28

## 2020-05-28 RX ORDER — NALOXONE HYDROCHLORIDE 0.4 MG/ML
.1-.4 INJECTION, SOLUTION INTRAMUSCULAR; INTRAVENOUS; SUBCUTANEOUS
Status: DISCONTINUED | OUTPATIENT
Start: 2020-05-28 | End: 2020-05-29 | Stop reason: HOSPADM

## 2020-05-28 RX ORDER — TRANEXAMIC ACID 10 MG/ML
1 INJECTION, SOLUTION INTRAVENOUS EVERY 30 MIN PRN
Status: DISCONTINUED | OUTPATIENT
Start: 2020-05-28 | End: 2020-05-29 | Stop reason: CLARIF

## 2020-05-28 RX ORDER — MODIFIED LANOLIN
OINTMENT (GRAM) TOPICAL
Status: DISCONTINUED | OUTPATIENT
Start: 2020-05-28 | End: 2020-05-29 | Stop reason: HOSPADM

## 2020-05-28 RX ORDER — OXYCODONE HYDROCHLORIDE 5 MG/1
5 TABLET ORAL EVERY 4 HOURS PRN
Status: DISCONTINUED | OUTPATIENT
Start: 2020-05-28 | End: 2020-05-29 | Stop reason: HOSPADM

## 2020-05-28 RX ORDER — ONDANSETRON 2 MG/ML
4 INJECTION INTRAMUSCULAR; INTRAVENOUS EVERY 6 HOURS PRN
Status: DISCONTINUED | OUTPATIENT
Start: 2020-05-28 | End: 2020-05-29 | Stop reason: CLARIF

## 2020-05-28 RX ORDER — PHENYLEPHRINE HCL IN 0.9% NACL 1 MG/10 ML
100 SYRINGE (ML) INTRAVENOUS EVERY 5 MIN PRN
Status: DISCONTINUED | OUTPATIENT
Start: 2020-05-28 | End: 2020-05-29 | Stop reason: CLARIF

## 2020-05-28 RX ORDER — OXYTOCIN/0.9 % SODIUM CHLORIDE 30/500 ML
100 PLASTIC BAG, INJECTION (ML) INTRAVENOUS CONTINUOUS
Status: DISCONTINUED | OUTPATIENT
Start: 2020-05-28 | End: 2020-05-29 | Stop reason: HOSPADM

## 2020-05-28 RX ORDER — ONDANSETRON 4 MG/1
4 TABLET, ORALLY DISINTEGRATING ORAL EVERY 6 HOURS PRN
Status: DISCONTINUED | OUTPATIENT
Start: 2020-05-28 | End: 2020-05-29 | Stop reason: CLARIF

## 2020-05-28 RX ORDER — BISACODYL 10 MG
10 SUPPOSITORY, RECTAL RECTAL DAILY PRN
Status: DISCONTINUED | OUTPATIENT
Start: 2020-05-30 | End: 2020-05-29 | Stop reason: HOSPADM

## 2020-05-28 RX ORDER — LIDOCAINE 40 MG/G
CREAM TOPICAL
Status: DISCONTINUED | OUTPATIENT
Start: 2020-05-28 | End: 2020-05-29 | Stop reason: CLARIF

## 2020-05-28 RX ORDER — FENTANYL/BUPIVACAINE/NS/PF 2-1250MCG
PLASTIC BAG, INJECTION (ML) INJECTION
Status: COMPLETED
Start: 2020-05-28 | End: 2020-05-28

## 2020-05-28 RX ORDER — OXYTOCIN/0.9 % SODIUM CHLORIDE 30/500 ML
1-24 PLASTIC BAG, INJECTION (ML) INTRAVENOUS CONTINUOUS
Status: DISCONTINUED | OUTPATIENT
Start: 2020-05-28 | End: 2020-05-28

## 2020-05-28 RX ORDER — AMOXICILLIN 250 MG
1 CAPSULE ORAL 2 TIMES DAILY
Status: DISCONTINUED | OUTPATIENT
Start: 2020-05-28 | End: 2020-05-29 | Stop reason: HOSPADM

## 2020-05-28 RX ORDER — AMOXICILLIN 250 MG
2 CAPSULE ORAL 2 TIMES DAILY
Status: DISCONTINUED | OUTPATIENT
Start: 2020-05-28 | End: 2020-05-29 | Stop reason: HOSPADM

## 2020-05-28 RX ORDER — TRANEXAMIC ACID 10 MG/ML
1 INJECTION, SOLUTION INTRAVENOUS EVERY 30 MIN PRN
Status: DISCONTINUED | OUTPATIENT
Start: 2020-05-28 | End: 2020-05-29 | Stop reason: HOSPADM

## 2020-05-28 RX ORDER — BUPIVACAINE HYDROCHLORIDE 2.5 MG/ML
INJECTION, SOLUTION EPIDURAL; INFILTRATION; INTRACAUDAL PRN
Status: DISCONTINUED | OUTPATIENT
Start: 2020-05-28 | End: 2020-05-28

## 2020-05-28 RX ORDER — HYDROCORTISONE 2.5 %
CREAM (GRAM) TOPICAL 3 TIMES DAILY PRN
Status: DISCONTINUED | OUTPATIENT
Start: 2020-05-28 | End: 2020-05-29 | Stop reason: HOSPADM

## 2020-05-28 RX ORDER — FENTANYL CITRATE 50 UG/ML
50-100 INJECTION, SOLUTION INTRAMUSCULAR; INTRAVENOUS
Status: DISCONTINUED | OUTPATIENT
Start: 2020-05-28 | End: 2020-05-28

## 2020-05-28 RX ADMIN — SENNOSIDES, DOCUSATE SODIUM 1 TABLET: 50; 8.6 TABLET, FILM COATED ORAL at 20:55

## 2020-05-28 RX ADMIN — SODIUM CHLORIDE, POTASSIUM CHLORIDE, SODIUM LACTATE AND CALCIUM CHLORIDE 1000 ML: 600; 310; 30; 20 INJECTION, SOLUTION INTRAVENOUS at 01:40

## 2020-05-28 RX ADMIN — IBUPROFEN 800 MG: 800 TABLET ORAL at 11:49

## 2020-05-28 RX ADMIN — BUPIVACAINE HYDROCHLORIDE 10 ML: 2.5 INJECTION, SOLUTION EPIDURAL; INFILTRATION; INTRACAUDAL at 02:05

## 2020-05-28 RX ADMIN — Medication: at 02:15

## 2020-05-28 RX ADMIN — IBUPROFEN 800 MG: 800 TABLET ORAL at 04:54

## 2020-05-28 RX ADMIN — IBUPROFEN 800 MG: 800 TABLET ORAL at 23:32

## 2020-05-28 RX ADMIN — SODIUM CHLORIDE, POTASSIUM CHLORIDE, SODIUM LACTATE AND CALCIUM CHLORIDE 500 ML: 600; 310; 30; 20 INJECTION, SOLUTION INTRAVENOUS at 06:31

## 2020-05-28 RX ADMIN — Medication 100 ML/HR: at 07:27

## 2020-05-28 RX ADMIN — Medication 340 ML/HR: at 03:37

## 2020-05-28 RX ADMIN — IBUPROFEN 800 MG: 800 TABLET ORAL at 17:38

## 2020-05-28 RX ADMIN — ACETAMINOPHEN 650 MG: 325 TABLET, FILM COATED ORAL at 20:55

## 2020-05-28 RX ADMIN — Medication 2 MILLI-UNITS/MIN: at 03:12

## 2020-05-28 RX ADMIN — Medication 100 ML/HR: at 05:12

## 2020-05-28 ASSESSMENT — MIFFLIN-ST. JEOR: SCORE: 1452.04

## 2020-05-28 NOTE — PLAN OF CARE
Data: [facility]: 2020  1:22 AM.  Reason for maternal/fetal assessment is uterine contractions. Patient reports feeling contractions beginning around 8 pm and having regular, strong contractions at midnight.  Patient is a .  Prenatal record reviewed. Pregnancy has been uncomplicated..  Gestational Age 40w1d. VSS. Fetal movement present. Patient denies leaking of vaginal fluid/rupture of membranes, vaginal bleeding, abdominal pain, pelvic pressure, nausea, vomiting, headache, visual disturbances, epigastric or URQ pain, significant edema. Support person is present.   Action: Verbal consent for EFM. Triage assessment completed. Bill of rights reviewed.  Response: Patient verbalized agreement with plan. Will contact Dr Nisreen Morin Md with update and further orders.

## 2020-05-28 NOTE — PLAN OF CARE
"Assessed patient at 0530. Plan to ambulate to the bathroom to empty bladder. Uterus WDL. Bleeding scant to light. VSS. Assisted to standing position. Marched in place. Patient stated confidence to walk to bathroom. No signs or symptoms of lightheadedness. Once patient sat on toilet, attempted to void but was unable. Patient stated she felt lightheaded but not nauseous. A cold towel was placed on neck and encouraged patient to take deep breaths. Patient then fell forward and lost consciousness but remained seated on toilet. For ~10 seconds patient was unresponsive. Call light put on and another nurse responded. At this time, patient regained consciousness and responded, \"yeah, I'm just tired\". Nirmal steady wheeled in room by other nurse and smelling salts administered. Assist X2 with nirmal steady out of bathroom. While on nirmal steady about to sit back into bed, patient felt faint again and loss consciousness once more. Another nurse was called. Verbal and touch stimuli. Smelling salts used. ~10 seconds passed and patient regained consciousness but would not respond. More verbal and touch stimuli. Patient mumbling. Assist x3 back into bed. Charge nurse in room to help patient back into bed. Fundal check preformed and small clot expressed but bleeding scant. Some heavier bleeding noted in toilet after patient was settled in bed. VSS. Several more uterine checks all WDL. Patient responded, \"feeling much better\". Oriented X4. Will continue to monitor.   "

## 2020-05-28 NOTE — PLAN OF CARE
Data: Gretel Charles transferred to 454 via wheelchair at 0900. Baby transferred via parent's arms.  Action: Receiving unit notified of transfer: Yes. Patient and family notified of room change. Report given to Monse GONZÁLES at 0910. Belongings sent to receiving unit. Accompanied by Registered Nurse. Oriented patient to surroundings. Call light within reach. ID bands double-checked with receiving RN.  Response: Patient tolerated transfer and is stable.    Patients mobililty level scored using the bedside mobility assistance tool (BMAT). Patient is at a mobility level test number: 3. Mobility equipment used: wheelchair. Required assist of 1 staff members. Further use of BMAT scoring required.

## 2020-05-28 NOTE — PROVIDER NOTIFICATION
05/28/20 0625   Provider Notification   Provider Name/Title Dr. Morin   Method of Notification Phone     MD notified about patient fainting while getting up to the bathroom. Patient did not fall, but passed out for a couple seconds while on the toilet and while on the nirmal steady. Smelling salts given to patient which greatly improved her overall state. BP stable, one golf ball sized clot expressed and straight cathed for 300 ml. Verbal orders from MD for a stat hemaglobin and 500 ml bolus of LR.

## 2020-05-28 NOTE — PROVIDER NOTIFICATION
20 0132   Provider Notification   Provider Name/Title Dr. Morin   Method of Notification Phone   Notification Reason SVE;Other (Comment)     OB updated  arrived SVE . OB on way to hospital. She is 20 minutes away. Intrapartum orders received.

## 2020-05-28 NOTE — H&P
"OB Brief Admit H&P    No significant change in general health status based on examination of the patient, review of Nursing Admission Database and prenatal record.    Pt is a 35 year old  @ 40w1d who presented to L&D with regular uterine contractions. Painful contractions started around 12:30 am, increasing in frequency and intensity. Denies leakage of fluid or vaginal bleeding. Sitting for epidural.    Patient's prenatal course has been complicated by:  1. Elevated 1 hour GTT, normal 3 hour GTT  2. H/o moderate shoulder dystocia w 8#5oz second baby    Prenatal Labs:    Blood type B+  Rubella immune    (3 hour = 88, 167, 127, 76)  GBS negative 2020    EFW: 8.5 lbs    BP 90/53   Temp 98.2  F (36.8  C) (Oral)   Resp 16   Ht 1.626 m (5' 4\")   Wt 76.2 kg (168 lb)   BMI 28.84 kg/m    EFM:  140, moderate variability, ++accels, no decels (cateogry 1)  Modena: Q2-3  SVE: 9/100%/0  Membranes:  Intact    COVID PCR pending.    Assessment:  35 year old  @ 40w1d admitted for labor. GBS negative    Plan:  1. Admit to labor and delivery   2. Plan epidural.  3. Anticipate  with attention to known history of shoulder dystoica. Patient has previously been counseled on risks and management options and declined delivery by  section.  4. Declined Tdap until after delivery.    Nisreen Morin MD  2020  2:22 AM      "

## 2020-05-28 NOTE — PROGRESS NOTES
"OB Post-partum Note  PPD# 0    S:   Had syncopal episode when sitting on toilet earlier this morning. Currently denies dizziness, light-headedness or SOB. Bleeding normal. Pain controlled.  Voiding.  Bleeding normal.  Breast feeding.    O:  /72   Temp 97.8  F (36.6  C) (Oral)   Resp 16   Ht 1.626 m (5' 4\")   Wt 76.2 kg (168 lb)   Breastfeeding Unknown   BMI 28.84 kg/m    Gen- A&O, NAD  Abd- Non-tender, fundus firm at umbilicus  Ext- non-tender, no edema    Hemoglobin   Date Value Ref Range Status   2020 10.5 (L) 11.7 - 15.7 g/dL Final     B+  Rubella Immune    A/P: 35 year old  PPD# 0 s/p .     1.  Routine post-partum cares.  2.  Syncopal episode likely vaso-vagal. Will monitor for ongoing bleeding or symptoms.  3.  Anticipate d/c home on PPD# 1-2.    Nisreen Morin MD  2020  9:47 AM  "

## 2020-05-28 NOTE — L&D DELIVERY NOTE
Delivery Date:  2020      HISTORY OF PRESENT ILLNESS:  Gretel Charles is a very pleasant 35-year-old female,  5, now para 3-0-2-3, who presented to Labor and Delivery at 40 weeks and 1 day gestation complaining of regular uterine contractions.  She had contractions earlier in the evening, but they became extremely painful around 12:30 a.m. and continued increasing in frequency and intensity the time of delivery.  She was denying leakage of fluid or vaginal bleeding.      [facility], the patient was 7 cm dilated and regularly osito.  She requested an epidural and was admitted for IV fluids to prepare for this.  She was known to be GBS negative and fetal heart rate tracing on admission was category 1 with baseline of 140, moderate variability with accelerations present.  Estimated fetal weight was 8-1/2 pounds.      The patient's prenatal care was notable for care at [facility].  She had a notable history of a moderate shoulder dystocia with her 8 pounds 5 ounce second baby and this pregnancy notable for elevated 1-hour glucose tolerance test, but a normal 3-hour glucose tolerance test.      Pertinent labs include blood type B positive, rubella immune, glucose challenge test 163, glucose tolerance test 88, 167, 127, 76 and group B strep negative on 2020.      HOSPITAL COURSE:  The patient was admitted in active labor.  She received an epidural at her request.  I checked her cervix after this and she was 9 cm dilated, 100% effaced and 0 station.  The patient was allowed to rest and become comfortable.  Soon thereafter, I performed artificial rupture of membranes with clear fluid resulting.  The patient was then subsequently completely dilated and able to begin pushing.  She had good maternal expulsive efforts.  She slowly brought the fetal vertex down to .  Ultimately, she did deliver the fetal occiput anterior over an intact perineum.  Delayed  delivery of the shoulders was noted and shoulder dystocia was called.  Suprapubic pressure and Sina maneuvers were employed.  These did not deliver the anterior shoulder so delivery of the posterior arm was undertaken by gently sweeping the left infant's left arm towards the head and delivering this arm.  Subsequently, shoulders delivered without issue and the body and the baby delivered in an uncomplicated fashion.  Duration of shoulder end-stage dystocia was approximately 30 seconds.  There was an immediate cry upon delivery of the infant.  Infant was placed on the maternal abdomen.  After 60 seconds, the cord was clamped x 2 and cut.  Delivery of a vigorous viable female infant, weight 9 lbs 1 oz and Apgars 7 & 9 at 1 and 5 minutes.    The placenta was delivered spontaneously and intact with trailing membranes and a 3-vessel cord.  The fundus was firm with Pitocin and bimanual massage.  The perineum was inspected and a second-degree midline laceration was noted.  This was repaired with a 3-0 Vicryl in a running fashion.  Several interrupted 3-0 Vicryl sutures were required to create hemostasis at the perineum.  A right posterior labial inclusion cyst was noted and the patient requested this be evaluated.  After incising this area, it released a small amount of caseous material and no tissue was sent to pathology.  This area was hemostatic.  At this time, mother and infant are resting comfortably without complication.  Quantitative blood loss pending, but estimated blood loss is 350 mL.         MARK GUTIERRES MD             D: 2020   T: 2020   MT: MARCOS      Name:     IVANA NICOLE   MRN:      [id number]        Account:        [id number]   :      1985        Delivery Date:  2020               Document: [id number]       cc: [facility]

## 2020-05-28 NOTE — PLAN OF CARE
Data: Vital signs within normal limits. Postpartum checks within normal limits - see flow record. Patient eating and drinking normally. Patient able to empty bladder independently and is up ambulating, no further faintness/dizziness . No apparent signs of infection. Patient performing self cares and is able to care for infant.  Action: Patient medicated during the shift for Rebeca pain and cramping. See MAR. Patient reassessed within 1 hour after each medication and pain was improved - patient stated she was comfortable. Patient education done about port partum pain management. See flow record.  Response: Positive attachment behaviors observed with infant. Support person present.   Plan: Anticipate discharge on 5/30/2020.

## 2020-05-28 NOTE — ANESTHESIA PREPROCEDURE EVALUATION
"Anesthesia Pre-Procedure Evaluation    Patient: Gretel Charles   MRN: [id number] : 1985          Preoperative Diagnosis: * No surgery found *        Past Medical History:   Diagnosis Date     Abnormal Pap smear      , normal now      Past Surgical History:   Procedure Laterality Date     C ORAL SURGERY PROCEDURE      wisdom teeth     HYSTEROSCOPY       Anesthesia Evaluation       history and physical reviewed . Pt has had prior anesthetic. Type: General    No history of anesthetic complications          ROS/MED HX    ENT/Pulmonary:  - neg pulmonary ROS     Neurologic:  - neg neurologic ROS     Cardiovascular:  - neg cardiovascular ROS       METS/Exercise Tolerance:     Hematologic:         Musculoskeletal:         GI/Hepatic:  - neg GI/hepatic ROS       Renal/Genitourinary:         Endo:         Psychiatric:         Infectious Disease:         Malignancy:         Other:                     neg OB ROS            Physical Exam  Normal systems: cardiovascular, pulmonary and dental    Airway   Mallampati: II  TM distance: > 3 FB  Neck ROM: full  Mouth opening: > 3 cm    Dental     Cardiovascular       Pulmonary             Lab Results   Component Value Date    WBC 7.3 10/29/2019    HGB 13.2 10/29/2019    HCT 38.2 10/29/2019     10/29/2019       Preop Vitals  BP Readings from Last 3 Encounters:   20 106/53   20 128/58   17 130/77    Pulse Readings from Last 3 Encounters:   20 93   17 88      Resp Readings from Last 3 Encounters:   20 16   17 18   01/21/15 20    SpO2 Readings from Last 3 Encounters:   20 97%      Temp Readings from Last 1 Encounters:   20 98.2  F (36.8  C) (Oral)    Ht Readings from Last 1 Encounters:   20 1.626 m (5' 4\")      Wt Readings from Last 1 Encounters:   20 76.2 kg (168 lb)    Estimated body mass index is 28.84 kg/m  as calculated from the following:    Height as of this encounter: 1.626 m (5' 4\").    Weight as of " this encounter: 76.2 kg (168 lb).       Anesthesia Plan      History & Physical Review      ASA Status:  2 .  OB Epidural Asa: 2       Plan for Epidural            Postoperative Care      Consents  Anesthetic plan, risks, benefits and alternatives discussed with:  Patient..                 Goldy Marsh MD                    .

## 2020-05-28 NOTE — ANESTHESIA PROCEDURE NOTES
Procedure note : epidural catheter  Staff -   Anesthesiologist:  Goldy Marsh MD      Performed By: anesthesiologist    Referred By: Mikel    Pre-Procedure  Performed by Goldy Marsh MD  Referred by Mikel  Location: OB    Procedure Times:5/28/2020 1:49 AM and 5/28/2020 2:06 AM  Pre-Anesthestic Checklist: patient identified, IV checked, risks and benefits discussed, informed consent, monitors and equipment checked, pre-op evaluation and at physician/surgeon's request    Timeout  Correct Patient: Yes   Correct Procedure: Yes   Correct Site: Yes   Correct Laterality: N/A   Correct Position: Yes   Site Marked: N/A   .   Procedure Documentation    .    Procedure: epidural catheter, .   Patient Position:sitting Insertion Site:L3-4  (midline approach) Injection technique: LORT saline   Local skin infiltrated with mL of 1% lidocaine.  ASHLEIGH at 4 cm    Patient Prep/Sterile Barriers; mask, sterile gloves, povidone-iodine 7.5% surgical scrub, patient draped.  .  Needle: Touhy needle   Needle Gauge: 17.    Needle Length (Inches) 3.5   # of attempts: 1 and # of redirects:  .    Catheter: 19 G . .  Catheter threaded easily  6 cm epidural space.  10 cm at skin.   .    Assessment/Narrative  Paresthesias: No.  .  .  Aspiration negative for heme or CSF  . Test dose of 3 mL lidocaine 1.5% w/ 1:200,000 epinephrine at 02:02.  Test dose negative for signs of intravascular, subdural or intrathecal injection.

## 2020-05-29 VITALS
DIASTOLIC BLOOD PRESSURE: 84 MMHG | SYSTOLIC BLOOD PRESSURE: 129 MMHG | HEIGHT: 64 IN | TEMPERATURE: 97.7 F | RESPIRATION RATE: 16 BRPM | BODY MASS INDEX: 28.68 KG/M2 | HEART RATE: 77 BPM | WEIGHT: 168 LBS

## 2020-05-29 LAB — HGB BLD-MCNC: 9 G/DL (ref 11.7–15.7)

## 2020-05-29 PROCEDURE — 85018 HEMOGLOBIN: CPT | Performed by: OBSTETRICS & GYNECOLOGY

## 2020-05-29 PROCEDURE — 25000132 ZZH RX MED GY IP 250 OP 250 PS 637: Performed by: OBSTETRICS & GYNECOLOGY

## 2020-05-29 PROCEDURE — 36415 COLL VENOUS BLD VENIPUNCTURE: CPT | Performed by: OBSTETRICS & GYNECOLOGY

## 2020-05-29 RX ORDER — AMOXICILLIN 250 MG
1 CAPSULE ORAL 2 TIMES DAILY PRN
COMMUNITY
Start: 2020-05-29

## 2020-05-29 RX ORDER — ACETAMINOPHEN 325 MG/1
650 TABLET ORAL EVERY 4 HOURS PRN
COMMUNITY
Start: 2020-05-29

## 2020-05-29 RX ORDER — IBUPROFEN 800 MG/1
800 TABLET, FILM COATED ORAL EVERY 8 HOURS PRN
COMMUNITY
Start: 2020-05-29

## 2020-05-29 RX ADMIN — IBUPROFEN 800 MG: 800 TABLET ORAL at 09:24

## 2020-05-29 RX ADMIN — ACETAMINOPHEN 650 MG: 325 TABLET, FILM COATED ORAL at 00:49

## 2020-05-29 RX ADMIN — ACETAMINOPHEN 650 MG: 325 TABLET, FILM COATED ORAL at 09:08

## 2020-05-29 RX ADMIN — SENNOSIDES, DOCUSATE SODIUM 1 TABLET: 50; 8.6 TABLET, FILM COATED ORAL at 09:08

## 2020-05-29 RX ADMIN — ACETAMINOPHEN 650 MG: 325 TABLET, FILM COATED ORAL at 04:49

## 2020-05-29 NOTE — PLAN OF CARE
Vitals stable throughout the day, fundus firm and midline, bleeding controlled with minimal small clots. Pain controlled with Tylenol and Ibuprofen. Breastfeeding every 2-3 hours with an adequate latch score. Bonding well with baby, responding to infant cues. Discharge education completed, reported no further questions. No concerns at the time.     Walked out with family at 1450

## 2020-05-29 NOTE — ANESTHESIA POSTPROCEDURE EVALUATION
S/P epidural for labor.   I or my partner was immediately available for management of this patient during epidural analgesia infusion.  VSS.  Doing well. Block resolved.  Neuro at baseline. Denies positional headache. Minimal side effects easily managed w/ PRN meds. No apparent anesthetic complications. No follow-up required.    Antoine Stephens, MDPatient: Gretel Charles    * No procedures listed *    Diagnosis:* No pre-op diagnosis entered *  Diagnosis Additional Information: No value filed.    Anesthesia Type:  Epidural    Note:  Anesthesia Post Evaluation    Last vitals:  Vitals:    05/28/20 1200 05/28/20 1600 05/28/20 2332   BP: 128/73 117/74 116/69   Pulse: 78 76 79   Resp: 16 16 16   Temp: 98  F (36.7  C) 98.7  F (37.1  C) 97.6  F (36.4  C)         Electronically Signed By: Antoine Stephens MD  May 29, 2020  6:49 AM

## 2020-05-29 NOTE — PLAN OF CARE
VSS.  Up independently, voiding adequate amounts.  Breast feeding going well per pt.  Nurse only able to watch one attempt and baby did not latch well.  Pain well controlled with medications.  FOB at bedside and supportive.

## 2020-05-29 NOTE — PROGRESS NOTES
"OB Post-partum Note  PPD# 1    S:  Patient doing well.  Pain controlled.  Voiding.  Bleeding is normal.  Breast feeding.    O:  /69   Pulse 79   Temp 97.6  F (36.4  C) (Oral)   Resp 16   Ht 1.626 m (5' 4\")   Wt 76.2 kg (168 lb)   Breastfeeding Unknown   BMI 28.84 kg/m    Gen- A&O, NAD  Abd- Non-tender, fundus non tender and firm   Ext- non-tender, no calf pain    Hemoglobin   Date Value Ref Range Status   2020 9.0 (L) 11.7 - 15.7 g/dL Final     B +  Rubella Immune    A/P: 35 year old  PPD# 1 s/p     1.  Routine post-partum cares  2.  Analgesia Tylenol/Motrin  3.  Discharge PPD#1  4.  The plan of care was discussed with the patient.  She expressed understanding and agreement  5.  RTC in 6 weeks  6.  Indications to call or return were discussed. Post partum instructions were given      Delmi Antunez MD  2020  8:15 AM  "

## 2020-12-20 ENCOUNTER — HEALTH MAINTENANCE LETTER (OUTPATIENT)
Age: 35
End: 2020-12-20

## 2021-10-03 ENCOUNTER — HEALTH MAINTENANCE LETTER (OUTPATIENT)
Age: 36
End: 2021-10-03

## 2022-01-23 ENCOUNTER — HEALTH MAINTENANCE LETTER (OUTPATIENT)
Age: 37
End: 2022-01-23

## 2022-09-10 ENCOUNTER — HEALTH MAINTENANCE LETTER (OUTPATIENT)
Age: 37
End: 2022-09-10

## 2023-04-30 ENCOUNTER — HEALTH MAINTENANCE LETTER (OUTPATIENT)
Age: 38
End: 2023-04-30

## 2025-03-19 NOTE — DISCHARGE INSTRUCTIONS

## 2025-06-23 ENCOUNTER — LAB REQUISITION (OUTPATIENT)
Dept: LAB | Facility: CLINIC | Age: 40
End: 2025-06-23

## 2025-06-23 DIAGNOSIS — O03.4 INCOMPLETE SPONTANEOUS ABORTION WITHOUT COMPLICATION: ICD-10-CM

## 2025-06-23 LAB
ERYTHROCYTE [DISTWIDTH] IN BLOOD BY AUTOMATED COUNT: 12 % (ref 10–15)
HCT VFR BLD AUTO: 41.5 % (ref 35–47)
HGB BLD-MCNC: 14.4 G/DL (ref 11.7–15.7)
MCH RBC QN AUTO: 31.6 PG (ref 26.5–33)
MCHC RBC AUTO-ENTMCNC: 34.7 G/DL (ref 31.5–36.5)
MCV RBC AUTO: 91 FL (ref 78–100)
PLATELET # BLD AUTO: 236 10E3/UL (ref 150–450)
RBC # BLD AUTO: 4.55 10E6/UL (ref 3.8–5.2)
WBC # BLD AUTO: 6.5 10E3/UL (ref 4–11)

## 2025-06-23 PROCEDURE — 85014 HEMATOCRIT: CPT | Performed by: OBSTETRICS & GYNECOLOGY
